# Patient Record
Sex: MALE | NOT HISPANIC OR LATINO | URBAN - METROPOLITAN AREA
[De-identification: names, ages, dates, MRNs, and addresses within clinical notes are randomized per-mention and may not be internally consistent; named-entity substitution may affect disease eponyms.]

---

## 2020-06-09 ENCOUNTER — TELEPHONE (OUTPATIENT)
Dept: OPHTHALMOLOGY | Facility: CLINIC | Age: 40
End: 2020-06-09

## 2020-06-09 NOTE — TELEPHONE ENCOUNTER
----- Message from Tristan Martinez sent at 6/9/2020 11:53 AM CDT -----  Contact: Patient  Patient called in and wanted to speak with someone at the office regarding scheduling an appointment. He would like a call back from the office and can be reached at    [phone]

## 2020-06-30 ENCOUNTER — OFFICE VISIT (OUTPATIENT)
Dept: OPHTHALMOLOGY | Facility: CLINIC | Age: 40
End: 2020-06-30
Attending: OPHTHALMOLOGY
Payer: COMMERCIAL

## 2020-06-30 DIAGNOSIS — H18.603 KERATOCONUS OF BOTH EYES: Primary | ICD-10-CM

## 2020-06-30 PROCEDURE — 92004 COMPRE OPH EXAM NEW PT 1/>: CPT | Mod: S$GLB,,, | Performed by: OPHTHALMOLOGY

## 2020-06-30 PROCEDURE — 99999 PR PBB SHADOW E&M-EST. PATIENT-LVL III: ICD-10-PCS | Mod: PBBFAC,,, | Performed by: OPHTHALMOLOGY

## 2020-06-30 PROCEDURE — 99999 PR PBB SHADOW E&M-EST. PATIENT-LVL III: CPT | Mod: PBBFAC,,, | Performed by: OPHTHALMOLOGY

## 2020-06-30 PROCEDURE — 92004 PR EYE EXAM, NEW PATIENT,COMPREHESV: ICD-10-PCS | Mod: S$GLB,,, | Performed by: OPHTHALMOLOGY

## 2020-06-30 RX ORDER — CLONAZEPAM 0.5 MG/1
TABLET ORAL
COMMUNITY
Start: 2019-01-22

## 2020-06-30 RX ORDER — VALACYCLOVIR HYDROCHLORIDE 500 MG/1
500 TABLET, FILM COATED ORAL 2 TIMES DAILY
COMMUNITY
Start: 2020-04-21

## 2020-06-30 RX ORDER — LISINOPRIL 40 MG/1
40 TABLET ORAL
COMMUNITY

## 2020-06-30 RX ORDER — AMLODIPINE BESYLATE 10 MG/1
10 TABLET ORAL DAILY
COMMUNITY
Start: 2020-03-27

## 2020-06-30 RX ORDER — TRIAMTERENE/HYDROCHLOROTHIAZID 37.5-25 MG
1 TABLET ORAL DAILY
COMMUNITY
Start: 2020-03-27

## 2020-06-30 NOTE — PROGRESS NOTES
HPI     Crosslinking eval    No eye surgery   Blink eyedrops PRN OU     Pt here for crosslinking eval. Pt states blurry VA OU without contacts.     Last edited by Rafia Jay MA on 6/30/2020  1:35 PM.   (History)            Assessment /Plan     For exam results, see Encounter Report.    Keratoconus of both eyes        The diagnosis of keratoconus and its etiology and clinical course were discussed.  Treatment with the use of specialty contact lenses, collagen cross linking, and corneal transplantation was explained in detail. This patient has never had LASIK.    This patient exhibits signs of progression of keratoconus and failure of conservative treatments with spectacles and/or contact lenses.   Disease progression is indicated by   - An increase of >1D in the Kmax  - An increase of >1D of astigmatism in the MRx  - An increase in myopia of >0.50D on MRx    I recommend treatment with Collagen Crosslinking using the Avedro FDA approved epi-off protocol with Photrexa and the KXL System, in order to stabilize this condition.    Plan:   KXL treatment OD  383 CCT OD    311 CCT OS (with paracentral scarring) Not a candidate for CXL    I have informed the patient that we will seek insurance pre-approval, but in case of failure of the insurance company to cover the cost of this medically necessary procedure, there may be a cost of $2,000 for the patient.

## 2020-08-18 ENCOUNTER — TELEPHONE (OUTPATIENT)
Dept: OPHTHALMOLOGY | Facility: CLINIC | Age: 40
End: 2020-08-18

## 2020-08-18 NOTE — TELEPHONE ENCOUNTER
Spoke to patient did not receive a fax but we are not at  today. Gave him [employer] fax number to send today.

## 2020-08-18 NOTE — TELEPHONE ENCOUNTER
----- Message from Silvina Grimaldo sent at 8/18/2020 11:01 AM CDT -----  Regarding: Forms  Pt calling to confirm if we received the forms faxed to us on yesterday from insurance company that will need to be addressed asap by the doctor.  Malinda checked phone room and we do not have anything but was not sure if someone picked up yet.  Please check to confirm and if we do not have them please contact the patient to inform so that he can get with them.  He can be reached at [phone]

## 2020-08-18 NOTE — TELEPHONE ENCOUNTER
----- Message from Kasie Burton sent at 8/18/2020  1:24 PM CDT -----  Regarding: please call back  Contact: Jd @ [phone]  Pt need to know if doctor received short term disability paperwork that needed to be filled out.

## 2020-08-25 ENCOUNTER — TELEPHONE (OUTPATIENT)
Dept: OPHTHALMOLOGY | Facility: CLINIC | Age: 40
End: 2020-08-25

## 2020-08-25 NOTE — TELEPHONE ENCOUNTER
----- Message from Silvina Grimaldo sent at 8/25/2020  9:02 AM CDT -----  Regarding: Short term disabiltiy  Contact: Jd  Pt calling to check the status of his paper work and needs to be sure that it is faxed back to his short term disability company no later than today.  He says he was told that the paperwork was received by the doctor but wanted to be sure that it is done.  Please call him at [phone].

## 2020-08-25 NOTE — TELEPHONE ENCOUNTER
Patient advised paperwork was sent to employer last week. Also emailed a copy of confirmation to his email address on file.

## 2020-08-25 NOTE — TELEPHONE ENCOUNTER
Patient advised paperwork submitted to employeer last week.  Will email him the copy of paperwork as well as

## 2020-08-26 ENCOUNTER — TELEPHONE (OUTPATIENT)
Dept: OPHTHALMOLOGY | Facility: CLINIC | Age: 40
End: 2020-08-26

## 2020-08-26 NOTE — TELEPHONE ENCOUNTER
----- Message from Ravindra De Los Santos sent at 8/26/2020  1:24 PM CDT -----  Regarding: Letter  Contact: Jd Lewis is calling to get more time off from prior paperwork done because vision is is still bad and need at least month off.      [phone]

## 2020-09-10 RX ORDER — OFLOXACIN 3 MG/ML
1 SOLUTION/ DROPS OPHTHALMIC 4 TIMES DAILY
Qty: 5 ML | Refills: 1 | Status: SHIPPED | OUTPATIENT
Start: 2020-09-10 | End: 2020-09-20

## 2020-09-10 RX ORDER — PREDNISOLONE ACETATE 10 MG/ML
1 SUSPENSION/ DROPS OPHTHALMIC 4 TIMES DAILY
Qty: 10 ML | Refills: 3 | Status: SHIPPED | OUTPATIENT
Start: 2020-09-10 | End: 2021-09-10

## 2020-09-10 NOTE — TELEPHONE ENCOUNTER
----- Message from Mora Cm sent at 9/10/2020  9:03 AM CDT -----  Regarding: Pt  Contact: PT  PT called to see when the doctor was going to prescribe the eye drops he needs to take before his procedure next week. Please call back     Callback: [phone]

## 2020-09-17 ENCOUNTER — CLINICAL SUPPORT (OUTPATIENT)
Dept: OPHTHALMOLOGY | Facility: CLINIC | Age: 40
End: 2020-09-17
Payer: COMMERCIAL

## 2020-09-17 DIAGNOSIS — H18.603 KERATOCONUS OF BOTH EYES: Primary | ICD-10-CM

## 2020-09-17 PROCEDURE — 0402T PR COLLAGEN CROSSLINKING CORNEA: ICD-10-PCS | Mod: RT,S$GLB,, | Performed by: OPHTHALMOLOGY

## 2020-09-17 PROCEDURE — 0402T COLGN CRS-LINK CRN&PACHYMTRY: CPT | Mod: RT,S$GLB,, | Performed by: OPHTHALMOLOGY

## 2020-09-17 PROCEDURE — 99499 UNLISTED E&M SERVICE: CPT | Mod: S$GLB,,, | Performed by: OPHTHALMOLOGY

## 2020-09-17 PROCEDURE — 99499 NO LOS: ICD-10-PCS | Mod: S$GLB,,, | Performed by: OPHTHALMOLOGY

## 2020-09-17 NOTE — PROGRESS NOTES
HPI     Here for CXL OD    Last edited by Elliot Matson MD on 9/17/2020  1:37 PM. (History)            Assessment /Plan     For exam results, see Encounter Report.    Keratoconus of both eyes      SURGEON:  Elliot Matson M.D.    PREOPERATIVE DIAGNOSIS: Keratoconus    POSTOPERATIVE DIAGNOSIS:  keratoconus    PROCEDURES:    Collagen Crosslinking right eye    ANESTHESIA: Topical Tetracaine 0.5%    COMPLICATIONS:  None    ESTIMATED BLOOD LOSS: None    SPECIMENS: None    INDICATIONS:  The patient has a history a progressive corneal ectasia.  During the initial consultation, a thorough discussion regarding of the risks, benefits, and alternatives to this procedure was undertaken.  Risks were listed on the consent form and were reviewed with emphasis on the remote possibility of infection, inflammation, scarring, and progression of ectasia - all of which can potentially lead to loss of vision.  Common side effects from the procedure, including pain, dry eye, glare, and haloes, were also explained, as well as defining realistic expectations of stabilizing the disease but not decreasing the need for glasses or contact lenses.  The patient voices understanding of these risks and side effects and communicates realistic expectations from the procedure.     DESCRIPTION OF PROCEDURE:  The patient was admitted to the [facility] at [facility] where the operative eye and procedure were verified, vital signs were taken, and pre-operatively 5-10mg of oral diazepam and drops of Zymar and Pred Forte were administered.  The patient was brought into the LASER suite and positioned on the bed.  A central 9mm epithelial debridement was achieved with the Amoils epithelial scrubber, and the initial riboflavin drops administered. A 30 minute induction with drops every 2 minutes was followed by pachymetry. When corneal pachy is less than 400um, hypotonic riboflavin drops are used to thicken the cornea to a minimum of 400um. Next, a  30 min UV light treatment, centered on the cornea was delivered, and riboflavin drops were continued. Both 3ml vials (2 units) of Riboflavin were used or wasted upon completion of the procedure. Antibiotics and a bandage contact lens were placed.  The patient was discharged with care instructions and a follow up appointment in the eye clinic.

## 2020-09-22 ENCOUNTER — OFFICE VISIT (OUTPATIENT)
Dept: OPHTHALMOLOGY | Facility: CLINIC | Age: 40
End: 2020-09-22
Attending: OPHTHALMOLOGY
Payer: COMMERCIAL

## 2020-09-22 DIAGNOSIS — H18.603 KERATOCONUS OF BOTH EYES: Primary | ICD-10-CM

## 2020-09-22 PROCEDURE — 92012 PR EYE EXAM, EST PATIENT,INTERMED: ICD-10-PCS | Mod: S$GLB,,, | Performed by: OPHTHALMOLOGY

## 2020-09-22 PROCEDURE — 92012 INTRM OPH EXAM EST PATIENT: CPT | Mod: S$GLB,,, | Performed by: OPHTHALMOLOGY

## 2020-09-22 PROCEDURE — 99999 PR PBB SHADOW E&M-EST. PATIENT-LVL III: CPT | Mod: PBBFAC,,, | Performed by: OPHTHALMOLOGY

## 2020-09-22 PROCEDURE — 99999 PR PBB SHADOW E&M-EST. PATIENT-LVL III: ICD-10-PCS | Mod: PBBFAC,,, | Performed by: OPHTHALMOLOGY

## 2020-09-22 NOTE — PROGRESS NOTES
HPI     POW1 CXL OD. Patient states he is doing well, notes no problems or   complaints.     Pred OD QID  Ocuflox OD QID     Last edited by Anisa Starr, PCT on 9/22/2020 12:34 PM. (History)            Assessment /Plan     For exam results, see Encounter Report.    Keratoconus of both eyes      POW1 KXL OD    Epi healed completely.  No signs of infection.  Ok to d/c abx. Cont Pred bid for 1 month.  FU 1 month.

## 2020-10-14 ENCOUNTER — TELEPHONE (OUTPATIENT)
Dept: OPHTHALMOLOGY | Facility: CLINIC | Age: 40
End: 2020-10-14

## 2020-10-14 NOTE — TELEPHONE ENCOUNTER
Spoke to patient offered appt today but does not have transportation. I told him he should be seen if he can in [place] and not wait until Friday. He will call his referring provider to see if he can be seen and will call back to let us know.

## 2020-10-14 NOTE — TELEPHONE ENCOUNTER
----- Message from Racheal Dowling sent at 10/14/2020  9:32 AM CDT -----  Regarding: FW: Had a crosslinking procedure a month and now having issues  Contact: Jd  Just spoke to Jd El.  Please call him asap.  He is hurting and cannot return to work due to pain and blurred va.  He said that complications started with putting CL in/ech  ----- Message -----  From: Jude Joyce  Sent: 10/14/2020   9:01 AM CDT  To: [facility] Ophthalmology Triage  Subject: Had a crosslinking procedure a month and now#    [facility]  OPHTHALMOLOGY TRIAGE CALL    Date:  10/14/2020   Time:  9:04 AM  Person Calling: Jd  Phone Number:  [phone] (home)   Call received by:  Jude Joyce  Patient in Clinic now:  No  Which eye:  Right  Name of Patient's Eye :  Dano  Date Last Seen:    Disposition of patient:Mr. El was advised that he was able to start wearing the contact lens again on that eye. His eye is sensitive to light, almost swollen shut, and a sharp pain.   How Long:yesterday    Additional Comments:

## 2020-10-15 ENCOUNTER — TELEPHONE (OUTPATIENT)
Dept: OPHTHALMOLOGY | Facility: CLINIC | Age: 40
End: 2020-10-15

## 2020-10-15 NOTE — TELEPHONE ENCOUNTER
Spoke to patient needs letter stating he is out of work until his visit on Tuesday 10/20/2020. He will leave his CTL out until his next visit.

## 2020-10-15 NOTE — TELEPHONE ENCOUNTER
----- Message from Chantel Garcia sent at 10/15/2020  9:27 AM CDT -----  Contact: Pt @ [phone]  Pt is needing to speak to someone about his contacts. He states he saw his dr in [place] today and was told that his cornea is scratched and isn't able to wear the lenses until he sees the dr next Tuesday for his p/o.

## 2020-10-20 ENCOUNTER — TELEPHONE (OUTPATIENT)
Dept: OPHTHALMOLOGY | Facility: CLINIC | Age: 40
End: 2020-10-20

## 2020-10-20 ENCOUNTER — OFFICE VISIT (OUTPATIENT)
Dept: OPHTHALMOLOGY | Facility: CLINIC | Age: 40
End: 2020-10-20
Attending: OPHTHALMOLOGY
Payer: COMMERCIAL

## 2020-10-20 DIAGNOSIS — H18.603 KERATOCONUS OF BOTH EYES: Primary | ICD-10-CM

## 2020-10-20 PROCEDURE — 99999 PR PBB SHADOW E&M-EST. PATIENT-LVL III: ICD-10-PCS | Mod: PBBFAC,,, | Performed by: OPHTHALMOLOGY

## 2020-10-20 PROCEDURE — 92014 PR EYE EXAM, EST PATIENT,COMPREHESV: ICD-10-PCS | Mod: S$GLB,,, | Performed by: OPHTHALMOLOGY

## 2020-10-20 PROCEDURE — 92014 COMPRE OPH EXAM EST PT 1/>: CPT | Mod: S$GLB,,, | Performed by: OPHTHALMOLOGY

## 2020-10-20 PROCEDURE — 99999 PR PBB SHADOW E&M-EST. PATIENT-LVL III: CPT | Mod: PBBFAC,,, | Performed by: OPHTHALMOLOGY

## 2020-10-20 NOTE — TELEPHONE ENCOUNTER
Spoke to patient will send message to Dr. Maddox and Dr. Michele for CTL fitting to see if he can get in earlier than November 18.

## 2020-10-20 NOTE — TELEPHONE ENCOUNTER
----- Message from Kasie Burton sent at 10/20/2020 12:08 PM CDT -----  Regarding: form that needed to be filled out  Contact: Jd @ [phone]  Pt needs a call back concerning a short disability form that needed to be filled out.

## 2020-10-20 NOTE — TELEPHONE ENCOUNTER
----- Message from Kasie Burton sent at 10/20/2020  3:53 PM CDT -----  Contact: Jd @ [phone]  Pt needs a call back regarding earlier conversation about specialty contact lens

## 2020-10-20 NOTE — PROGRESS NOTES
HPI     1 month S/P CXL OD         Pt reports that he has photophobia OD other than that no other problems,   using drops as directed in OD         Gtts: Pred bid OU     Pred OD QID  Ocuflox OD QID     Last edited by Kory Andrew on 10/20/2020  9:42 AM. (History)            Assessment /Plan     For exam results, see Encounter Report.    Keratoconus of both eyes      [facility] [place]    CXL OD only x 1month, OS advanced (but wearing CTL)  1 month s/p KXL Tx OD  Cornea healed with expected haze from Tx.  Pentacam done today.

## 2020-10-21 ENCOUNTER — TELEPHONE (OUTPATIENT)
Dept: OPHTHALMOLOGY | Facility: CLINIC | Age: 40
End: 2020-10-21

## 2020-10-21 NOTE — TELEPHONE ENCOUNTER
----- Message from Ravindra De Los Santos sent at 10/21/2020 10:29 AM CDT -----  Regarding: Paperwork  Contact: Jd Tomas to see if Dr. Matson received paperwork to fill out.      [phone]

## 2020-10-22 ENCOUNTER — PATIENT MESSAGE (OUTPATIENT)
Dept: OPHTHALMOLOGY | Facility: CLINIC | Age: 40
End: 2020-10-22

## 2020-10-23 ENCOUNTER — OFFICE VISIT (OUTPATIENT)
Dept: OPTOMETRY | Facility: CLINIC | Age: 40
End: 2020-10-23
Payer: COMMERCIAL

## 2020-10-23 ENCOUNTER — PATIENT MESSAGE (OUTPATIENT)
Dept: OPTOMETRY | Facility: CLINIC | Age: 40
End: 2020-10-23

## 2020-10-23 ENCOUNTER — PATIENT MESSAGE (OUTPATIENT)
Dept: OPHTHALMOLOGY | Facility: CLINIC | Age: 40
End: 2020-10-23

## 2020-10-23 DIAGNOSIS — H18.603 KERATOCONUS OF BOTH EYES: Primary | ICD-10-CM

## 2020-10-23 DIAGNOSIS — H52.213 IRREGULAR ASTIGMATISM OF BOTH EYES: ICD-10-CM

## 2020-10-23 PROCEDURE — 92310 PR CONTACT LENS FITTING (NO CHANGE): ICD-10-PCS | Mod: CSM,S$GLB,, | Performed by: OPTOMETRIST

## 2020-10-23 PROCEDURE — 92310 CONTACT LENS FITTING OU: CPT | Mod: CSM,S$GLB,, | Performed by: OPTOMETRIST

## 2020-10-23 PROCEDURE — 92004 COMPRE OPH EXAM NEW PT 1/>: CPT | Mod: S$GLB,,, | Performed by: OPTOMETRIST

## 2020-10-23 PROCEDURE — 99999 PR PBB SHADOW E&M-EST. PATIENT-LVL II: CPT | Mod: PBBFAC,,, | Performed by: OPTOMETRIST

## 2020-10-23 PROCEDURE — 99999 PR PBB SHADOW E&M-EST. PATIENT-LVL II: ICD-10-PCS | Mod: PBBFAC,,, | Performed by: OPTOMETRIST

## 2020-10-23 PROCEDURE — 92004 PR EYE EXAM, NEW PATIENT,COMPREHESV: ICD-10-PCS | Mod: S$GLB,,, | Performed by: OPTOMETRIST

## 2020-10-23 RX ORDER — HYDROCHLOROTHIAZIDE 12.5 MG/1
CAPSULE ORAL
COMMUNITY

## 2020-10-23 RX ORDER — TRAMADOL HYDROCHLORIDE 50 MG/1
TABLET ORAL
COMMUNITY

## 2020-10-23 RX ORDER — MELOXICAM 15 MG/1
TABLET ORAL
COMMUNITY

## 2020-10-23 RX ORDER — METFORMIN HYDROCHLORIDE 500 MG/1
TABLET ORAL
COMMUNITY

## 2020-10-23 RX ORDER — IBUPROFEN 800 MG/1
TABLET ORAL
COMMUNITY

## 2020-10-23 RX ORDER — CYCLOBENZAPRINE HCL 5 MG
1 TABLET ORAL
COMMUNITY
Start: 2020-08-06

## 2020-10-23 RX ORDER — ESCITALOPRAM OXALATE 20 MG/1
TABLET ORAL
COMMUNITY

## 2020-10-23 RX ORDER — LEVOTHYROXINE SODIUM 25 UG/1
TABLET ORAL
COMMUNITY

## 2020-10-23 NOTE — PROGRESS NOTES
HPI     Contact Lens Fit      Additional comments: scleral fit OD              Comments     Last eye exam was 10/20/20 with Dr. Matson.  Was fitted with scleral SCL's at [facility] in [place] earlier this   year. Has worn scleral SCL's for 2-3 years.           Last edited by Shagufta Foley on 10/23/2020  2:28 PM. (History)            Assessment /Plan     For exam results, see Encounter Report.    Keratoconus of both eyes  Irregular astigmatism of both eyes  Contact Lens Final Rx     Final Contact Lens Rx       Brand Base Curve Diameter Sphere Lens    Right SynergEyes VS  8.4 17.0 +0.75 3600 36/42    Left         Comments: This is not a final prescription.  This is specialty order contact lens that requires follow up care and warranty adjustment, dispensing clinic will be responsible for follow up care and warranty adjustments.                OS only 2 mo old, will consider Scleral in 1 year  [facility]      RTC 1 yr

## 2020-10-23 NOTE — LETTER
October 23, 2020    Jd El  [address]  [place] [address]             [facility] Pugh - Optometry 1st Fl  [address]  [facility] [address]  Phone: [phone]  Fax: [phone] Re: Jd El  MRN: [id number]  YOB: 1980    10/23/2020    To Whom It May Concern:    Jd El has a diagnosis  Keratoconus (ICD-10 Code H18.609) and irregular astigmatism (ICD-10 H52.219).  Keratoconus is an irregular protrusion of the cornea, the clear tissue over the colored part of the eye.  The cornea acts like the windowpane to the eye.  If this windowpane is not smooth, the light will not bend evenly and an irregular image will be formed - similar to looking through bumpy glass.    Contact lenses cover irregular protrusions on the cornea and make a new smooth surface for the light to bend through.  In many cases a special design must be used.      The contact lenses related services such as Contact Lens, GP, Scleral, Per Lens (CPT Code  ) are medical necessary services.  They should be a covered benefit under the patient's medical health plan.    Please let me know if you have any questions.    Sincerely,      Jatinder Maddox OD

## 2020-11-12 ENCOUNTER — TELEPHONE (OUTPATIENT)
Dept: OPTOMETRY | Facility: CLINIC | Age: 40
End: 2020-11-12

## 2020-11-12 ENCOUNTER — TELEPHONE (OUTPATIENT)
Dept: OPHTHALMOLOGY | Facility: CLINIC | Age: 40
End: 2020-11-12

## 2020-11-12 NOTE — TELEPHONE ENCOUNTER
----- Message from Cheryl Pan sent at 11/12/2020  1:24 PM CST -----  Contact: Jd Nikko  Patient is calling to see if he could get another return to work form. Patient he needed the form emailed to him. Patient stated he deleted the other from that was sent to him. Patient contact number is [phone].

## 2020-11-12 NOTE — TELEPHONE ENCOUNTER
----- Message from Janki Baker sent at 11/12/2020  1:13 PM CST -----  Ok  ----- Message -----  From: AMPARO Beach  Sent: 11/12/2020  11:42 AM CST  To: Janki Baker    Ok to dispense   ----- Message -----  From: Janki Baker  Sent: 11/9/2020   3:43 PM CST  To: AMPARO Beach    This patient contact lens came in today.    Janki

## 2020-11-13 ENCOUNTER — TELEPHONE (OUTPATIENT)
Dept: OPTOMETRY | Facility: CLINIC | Age: 40
End: 2020-11-13

## 2020-11-13 NOTE — TELEPHONE ENCOUNTER
----- Message from Janki Baker sent at 11/13/2020  8:58 AM CST -----  This patient picked up his lens today.    Janki

## 2020-11-16 ENCOUNTER — PATIENT MESSAGE (OUTPATIENT)
Dept: OPTOMETRY | Facility: CLINIC | Age: 40
End: 2020-11-16